# Patient Record
Sex: MALE | Race: WHITE | URBAN - NONMETROPOLITAN AREA
[De-identification: names, ages, dates, MRNs, and addresses within clinical notes are randomized per-mention and may not be internally consistent; named-entity substitution may affect disease eponyms.]

---

## 2020-10-22 ENCOUNTER — NEW PATIENT (OUTPATIENT)
Dept: URBAN - NONMETROPOLITAN AREA CLINIC 3 | Facility: CLINIC | Age: 75
End: 2020-10-22
Payer: MEDICARE

## 2020-10-22 DIAGNOSIS — Z79.84 LONG TERM (CURRENT) USE OF ORAL ANTIDIABETIC DRUGS: ICD-10-CM

## 2020-10-22 DIAGNOSIS — E11.9 TYPE 2 DIABETES MELLITUS WITHOUT COMPLICATIONS: ICD-10-CM

## 2020-10-22 DIAGNOSIS — E11.3212 DIABETES MELLITUS TYPE 2 WITH MILD NON-PROLIFERATI: ICD-10-CM

## 2020-10-22 DIAGNOSIS — H34.8322 BRANCH RETINAL VEIN OCCLUSION, STABLE, LEFT EYE: Primary | ICD-10-CM

## 2020-10-22 PROCEDURE — 92014 COMPRE OPH EXAM EST PT 1/>: CPT | Performed by: OPTOMETRIST

## 2020-10-22 PROCEDURE — 92134 CPTRZ OPH DX IMG PST SGM RTA: CPT | Performed by: OPTOMETRIST

## 2020-10-22 ASSESSMENT — VISUAL ACUITY
OD: 20/30
OS: 20/40

## 2020-10-22 ASSESSMENT — INTRAOCULAR PRESSURE
OD: 12
OS: 12

## 2020-10-23 ENCOUNTER — NEW PATIENT (OUTPATIENT)
Dept: URBAN - METROPOLITAN AREA CLINIC 24 | Facility: CLINIC | Age: 75
End: 2020-10-23
Payer: MEDICARE

## 2020-10-23 DIAGNOSIS — H25.813 COMBINED FORMS OF AGE-RELATED CATARACT, BILATERAL: ICD-10-CM

## 2020-10-23 PROCEDURE — 92004 COMPRE OPH EXAM NEW PT 1/>: CPT | Performed by: OPHTHALMOLOGY

## 2020-10-23 PROCEDURE — 67028 INJECTION EYE DRUG: CPT | Performed by: OPHTHALMOLOGY

## 2020-10-23 PROCEDURE — 92134 CPTRZ OPH DX IMG PST SGM RTA: CPT | Performed by: OPHTHALMOLOGY

## 2020-10-23 ASSESSMENT — INTRAOCULAR PRESSURE
OD: 13
OS: 10

## 2020-11-23 ENCOUNTER — FOLLOW UP ESTABLISHED (OUTPATIENT)
Dept: URBAN - METROPOLITAN AREA CLINIC 24 | Facility: CLINIC | Age: 75
End: 2020-11-23
Payer: MEDICARE

## 2020-11-23 DIAGNOSIS — H35.3131 NONEXUDATIVE AGE-RELATED MACULAR DEGENERATION, BILATERAL, EARLY DRY STAGE: ICD-10-CM

## 2020-11-23 PROCEDURE — 92250 FUNDUS PHOTOGRAPHY W/I&R: CPT | Performed by: OPHTHALMOLOGY

## 2020-11-23 PROCEDURE — 92235 FLUORESCEIN ANGRPH MLTIFRAME: CPT | Performed by: OPHTHALMOLOGY

## 2020-11-23 PROCEDURE — 67028 INJECTION EYE DRUG: CPT | Performed by: OPHTHALMOLOGY

## 2020-11-23 ASSESSMENT — INTRAOCULAR PRESSURE
OD: 16
OS: 12

## 2020-12-30 ENCOUNTER — FOLLOW UP ESTABLISHED (OUTPATIENT)
Dept: URBAN - METROPOLITAN AREA CLINIC 24 | Facility: CLINIC | Age: 75
End: 2020-12-30
Payer: MEDICARE

## 2020-12-30 PROCEDURE — 92134 CPTRZ OPH DX IMG PST SGM RTA: CPT | Performed by: OPHTHALMOLOGY

## 2020-12-30 PROCEDURE — 67028 INJECTION EYE DRUG: CPT | Performed by: OPHTHALMOLOGY

## 2020-12-30 ASSESSMENT — INTRAOCULAR PRESSURE
OD: 10
OS: 8

## 2021-02-12 ENCOUNTER — FOLLOW UP ESTABLISHED (OUTPATIENT)
Dept: URBAN - METROPOLITAN AREA CLINIC 24 | Facility: CLINIC | Age: 76
End: 2021-02-12
Payer: MEDICARE

## 2021-02-12 PROCEDURE — 92134 CPTRZ OPH DX IMG PST SGM RTA: CPT | Performed by: OPHTHALMOLOGY

## 2021-02-12 PROCEDURE — 67028 INJECTION EYE DRUG: CPT | Performed by: OPHTHALMOLOGY

## 2021-02-12 PROCEDURE — 99214 OFFICE O/P EST MOD 30 MIN: CPT | Performed by: OPHTHALMOLOGY

## 2021-02-12 ASSESSMENT — INTRAOCULAR PRESSURE
OD: 18
OS: 16

## 2021-03-18 ENCOUNTER — FOLLOW UP ESTABLISHED (OUTPATIENT)
Dept: URBAN - METROPOLITAN AREA CLINIC 24 | Facility: CLINIC | Age: 76
End: 2021-03-18
Payer: MEDICARE

## 2021-03-18 PROCEDURE — 67028 INJECTION EYE DRUG: CPT | Performed by: OPHTHALMOLOGY

## 2021-03-18 PROCEDURE — 92235 FLUORESCEIN ANGRPH MLTIFRAME: CPT | Performed by: OPHTHALMOLOGY

## 2021-03-18 PROCEDURE — 92250 FUNDUS PHOTOGRAPHY W/I&R: CPT | Performed by: OPHTHALMOLOGY

## 2021-03-18 ASSESSMENT — INTRAOCULAR PRESSURE
OS: 16
OD: 14

## 2021-05-13 ENCOUNTER — OFFICE VISIT (OUTPATIENT)
Dept: URBAN - METROPOLITAN AREA CLINIC 24 | Facility: CLINIC | Age: 76
End: 2021-05-13
Payer: MEDICARE

## 2021-05-13 DIAGNOSIS — H35.033 HYPERTENSIVE RETINOPATHY, BILATERAL: ICD-10-CM

## 2021-05-13 DIAGNOSIS — E11.3293 TYPE 2 DIABETES MELLITUS WITH MILD NONPROLIFERATIVE DIABETIC RETINOPATHY WITHOUT MACULAR EDEMA, BILATERAL: ICD-10-CM

## 2021-05-13 PROCEDURE — 67028 INJECTION EYE DRUG: CPT | Performed by: OPHTHALMOLOGY

## 2021-05-13 PROCEDURE — 92134 CPTRZ OPH DX IMG PST SGM RTA: CPT | Performed by: OPHTHALMOLOGY

## 2021-05-13 ASSESSMENT — INTRAOCULAR PRESSURE
OS: 18
OD: 18

## 2021-05-13 NOTE — IMPRESSION/PLAN
Impression: Hypertensive retinopathy, bilateral Plan: URGED BP control. RECHECK BP every time. no Occl.  OD.  AV nick

## 2021-05-13 NOTE — IMPRESSION/PLAN
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 Avastin trial x Oct '20 CHECK BP EVERY TIME --  TOO HIGH Plan: hx: [[Marie, OD.  BRVO OS -late awareness (dementia) HMG LRG supr. BRVO -Vein Occl. PCP control HTN is key - TREAT w Avastin OS x Oct '20 - missed Apr '21 w RECUR HMG and drop VA]] TODAY Avastin OS (proc note)   CHECK BP Always - TOO HIGH
     RTC 5-6w TIGHT still dil, OCT, eval  -h/o plan Avastin  Future FLTx ?   Future FA ?

## 2021-06-16 ENCOUNTER — OFFICE VISIT (OUTPATIENT)
Dept: URBAN - METROPOLITAN AREA CLINIC 24 | Facility: CLINIC | Age: 76
End: 2021-06-16
Payer: MEDICARE

## 2021-06-16 PROCEDURE — 92134 CPTRZ OPH DX IMG PST SGM RTA: CPT | Performed by: OPHTHALMOLOGY

## 2021-06-16 PROCEDURE — 67028 INJECTION EYE DRUG: CPT | Performed by: OPHTHALMOLOGY

## 2021-06-16 ASSESSMENT — INTRAOCULAR PRESSURE
OD: 17
OS: 17

## 2021-07-28 ENCOUNTER — OFFICE VISIT (OUTPATIENT)
Dept: URBAN - METROPOLITAN AREA CLINIC 24 | Facility: CLINIC | Age: 76
End: 2021-07-28
Payer: MEDICARE

## 2021-07-28 PROCEDURE — 92134 CPTRZ OPH DX IMG PST SGM RTA: CPT | Performed by: OPHTHALMOLOGY

## 2021-07-28 PROCEDURE — 67028 INJECTION EYE DRUG: CPT | Performed by: OPHTHALMOLOGY

## 2021-07-28 PROCEDURE — 92235 FLUORESCEIN ANGRPH MLTIFRAME: CPT | Performed by: OPHTHALMOLOGY

## 2021-07-28 NOTE — IMPRESSION/PLAN
Impression: Hypertensive retinopathy, bilateral Plan: URGED BP control. RECHECK BP every time. no Occl. OD  -- d/w'd pt and wife -- URGED BETTER CONTROL of SALT / BP / Wt/ BP meds / BG / Chol.

## 2021-07-28 NOTE — IMPRESSION/PLAN
Impression: BRVO CME LEFT - Awareness late? Avastin trial x Oct '20 - CHECK BP ALWAYS Plan: hx: [[Marie, OD.  BRVO OS -late awareness (dementia) HMG LRG supr. BRVO -Vein Occl. PCP control HTN is key - TREAT w Avastin OS x Oct '20 - missed Apr '21 w RECUR HMG and drop VA]] TODAY Avastin OS (proc note)   CHECK BP Always - TOO HIGH at times RTC 5-6w TIGHT ND/inj/OCT  plan Avastin    Future FLTx ?    Future exam?

## 2021-07-28 NOTE — IMPRESSION/PLAN
Impression: Age-related cataract Plan:  DEFER all non-retinal care to DR. Franco Sultana et al in [place]    Future MAY do Phaco/VIT / Laser OS for definitive tx of BRVO and cataract

## 2021-09-01 ENCOUNTER — OFFICE VISIT (OUTPATIENT)
Dept: URBAN - METROPOLITAN AREA CLINIC 24 | Facility: CLINIC | Age: 76
End: 2021-09-01
Payer: MEDICARE

## 2021-09-01 PROCEDURE — 92134 CPTRZ OPH DX IMG PST SGM RTA: CPT | Performed by: OPHTHALMOLOGY

## 2021-09-01 PROCEDURE — 92250 FUNDUS PHOTOGRAPHY W/I&R: CPT | Performed by: OPHTHALMOLOGY

## 2021-09-01 PROCEDURE — 67028 INJECTION EYE DRUG: CPT | Performed by: OPHTHALMOLOGY

## 2021-09-01 ASSESSMENT — INTRAOCULAR PRESSURE
OD: 15
OS: 15

## 2021-09-01 NOTE — IMPRESSION/PLAN
Impression: BRVO CME LEFT - Awareness late? Avastin x Oct '20 - CHECK BP Plan: hx: [[Woods, OD.  BRVO OS -late awareness (dementia) HMG LRG supr. BRVO -Vein Occl. PCP control HTN is key - Avstn OS xOct '20 - Apr '21 RECUR HMG w drop VA]] TODAY Avastin OS (proc note)   CHECK BP Always - TOO HIGH at times RTC 5-6w TIGHT dil, OCT, eval - plan Avastin w IVTA combo (1st time) Future FLTx ? Future FA ? May need FLTx superiorly OS ? May need Ozurdex ? CME RECUR -- NOT RESOLVED w Avastin anti-VEGF alone.   NEED STEROID

## 2021-09-30 ENCOUNTER — OFFICE VISIT (OUTPATIENT)
Dept: URBAN - METROPOLITAN AREA CLINIC 24 | Facility: CLINIC | Age: 76
End: 2021-09-30
Payer: MEDICARE

## 2021-09-30 PROCEDURE — 99214 OFFICE O/P EST MOD 30 MIN: CPT | Performed by: OPHTHALMOLOGY

## 2021-09-30 PROCEDURE — 92134 CPTRZ OPH DX IMG PST SGM RTA: CPT | Performed by: OPHTHALMOLOGY

## 2021-09-30 PROCEDURE — 67028 INJECTION EYE DRUG: CPT | Performed by: OPHTHALMOLOGY

## 2021-09-30 ASSESSMENT — INTRAOCULAR PRESSURE
OS: 14
OD: 12

## 2021-09-30 NOTE — IMPRESSION/PLAN
Impression: Open angle with borderline chng Plan: LOW risk GLAUC but not zero. TODAY TAP AC to 12. No compl.

## 2021-09-30 NOTE — IMPRESSION/PLAN
Impression: BRVO CME LEFT - Awareness late? Avastin x Oct '20 - CHECK BP Plan: hx: [[Woods, OD.  BRVO OS -late awareness (dementia) HMG LRG supr. BRVO -Vein Occl. PCP control HTN is key - Avstn OS xOct '20 - Apr '21 RECUR HMG w drop VA]] TODAY Avastin OS & IVTA (proc note w TAP AC)   CHECK BP Always - TOO HIGH oft
     RTC IOP 2w - RTC 6w TIGHT ND/inj pos OCT plan Avastin w IVTA (2nd time) Future FLTx ? Future FA ? May need FLTx superiorly OS ? May need Ozurdex ? CME RECUR -- NOT RESOLVED w Avastin anti-VEGF alone. NEED STEROID TRIAL  Future MAY do Phaco/VIT / Laser OS for definitive tx of BRVO and cataract

## 2021-09-30 NOTE — IMPRESSION/PLAN
Impression: Age-related cataract Plan: DEFER all non-retinal care to DR. Marie, et al in [place]  - will progress w IVTA    Future MAY do Phaco/VIT / Laser OS for definitive tx of BRVO and cataract

## 2021-10-19 ENCOUNTER — OFFICE VISIT (OUTPATIENT)
Dept: URBAN - NONMETROPOLITAN AREA CLINIC 3 | Facility: CLINIC | Age: 76
End: 2021-10-19
Payer: MEDICARE

## 2021-10-19 DIAGNOSIS — H40.012 OPEN ANGLE WITH BORDERLINE FINDINGS, LOW RISK, LEFT EYE: Primary | ICD-10-CM

## 2021-10-19 PROCEDURE — 99212 OFFICE O/P EST SF 10 MIN: CPT | Performed by: OPTOMETRIST

## 2021-10-19 ASSESSMENT — INTRAOCULAR PRESSURE
OD: 15
OS: 17

## 2021-10-19 NOTE — IMPRESSION/PLAN
Impression: Open angle with borderline chng Plan: IOP at target. Advised to keep appointment as scheduled with Dr. Mary Rodriguez.

## 2021-11-15 ENCOUNTER — OFFICE VISIT (OUTPATIENT)
Dept: URBAN - METROPOLITAN AREA CLINIC 30 | Facility: CLINIC | Age: 76
End: 2021-11-15
Payer: MEDICARE

## 2021-11-15 DIAGNOSIS — H34.8320 TRIBUTARY (BRANCH) RETINAL VEIN OCCLUSION, LEFT EYE, WITH MACULAR EDEMA: Primary | ICD-10-CM

## 2021-11-15 PROCEDURE — 65800 DRAINAGE OF EYE: CPT | Performed by: OPHTHALMOLOGY

## 2021-11-15 PROCEDURE — 92134 CPTRZ OPH DX IMG PST SGM RTA: CPT | Performed by: OPHTHALMOLOGY

## 2021-11-15 ASSESSMENT — INTRAOCULAR PRESSURE
OD: 16
OS: 18

## 2021-11-15 NOTE — IMPRESSION/PLAN
Impression: BRVO CME LEFT - Awareness late? Avastin x Oct '20 - CHECK BP Plan: hx: [[Woods, OD.  BRVO OS -late awareness (dementia) HMG LRG supr. BRVO -Vein Occl. PCP control HTN is key - Avstn OS xOct '20 - Apr '21 RECUR HMG w drop VA]] TODAY Avastin OS & IVTA (proc note w TAP AC)   CHECK BP Always - TOO HIGH oft
      RTC 6-8w TIGHT Pos FA - plan Avastin w IVTA (3rd time - NO IOP issues) Future FLTx ? Future ND?? May need FLTx superiorly OS ? May need Ozurdex ? CME RECUR -- NOT RESOLVED w Avastin anti-VEGF alone.   DOES RESOLVE w IVTA ADD  -- Future MAY do Phaco/VIT / Laser OS for definitive tx of BRVO and cataract

## 2021-11-15 NOTE — IMPRESSION/PLAN
Impression: Age-related cataract Plan: DEFER all non-retinal care to DR. Marie, et al in [place]  - will progress w IVTA    Future MAY do Phaco/VIT / Laser OS for definitive tx of BRVO and cataract

## 2021-11-15 NOTE — IMPRESSION/PLAN
Impression: Open angle with borderline chng Plan: LOW risk GLAUC but not zero. TODAY TAP AC to 10. No compl.

## 2022-01-14 ENCOUNTER — OFFICE VISIT (OUTPATIENT)
Dept: URBAN - METROPOLITAN AREA CLINIC 24 | Facility: CLINIC | Age: 77
End: 2022-01-14
Payer: MEDICARE

## 2022-01-14 PROCEDURE — 92250 FUNDUS PHOTOGRAPHY W/I&R: CPT | Performed by: OPHTHALMOLOGY

## 2022-01-14 PROCEDURE — 92235 FLUORESCEIN ANGRPH MLTIFRAME: CPT | Performed by: OPHTHALMOLOGY

## 2022-01-14 PROCEDURE — 92134 CPTRZ OPH DX IMG PST SGM RTA: CPT | Performed by: OPHTHALMOLOGY

## 2022-01-14 PROCEDURE — 65800 DRAINAGE OF EYE: CPT | Performed by: OPHTHALMOLOGY

## 2022-01-14 ASSESSMENT — INTRAOCULAR PRESSURE
OD: 13
OS: 18

## 2022-01-14 NOTE — IMPRESSION/PLAN
Impression: BRVO CME LEFT - Awareness late? Avastin x Oct '20 - CHECK BP Plan: hx: [[Woods, OD.  BRVO OS -late awareness (dementia) HMG LRG supr. BRVO -Vein Occl. PCP control HTN is key - Avstn OS xOct '20 - Apr '21 RECUR HMG w drop VA]] TODAY Avastin OS & IVTA (proc note w TAP AC)   CHECK BP Always - TOO HIGH oft
      RTC 8w TIGHT ND/inj/OCT  plan Avastin w OZURDEX (if apprv'd -NO IOP issues) Future FLTx ? Future exam??  May need FLTx superiorly OS ? Cont Ozurdex ? CME RECUR -- NOT RESOLVED w Avastin anti-VEGF alone.   DOES RESOLVE w IVTA ADD  -- Future MAY do Phaco/VIT / Laser OS for definitive tx of BRVO and cataract

## 2022-01-14 NOTE — IMPRESSION/PLAN
Impression: Age-related cataract Plan: DEFER all non-retinal care to DR. Marie, et al in [place]  - will progress w IVTA    Future MAY do Phaco/VIT / Laser OS for definitive tx of BRVO and cataract

## 2022-03-11 ENCOUNTER — OFFICE VISIT (OUTPATIENT)
Dept: URBAN - METROPOLITAN AREA CLINIC 24 | Facility: CLINIC | Age: 77
End: 2022-03-11
Payer: MEDICARE

## 2022-03-11 PROCEDURE — 92134 CPTRZ OPH DX IMG PST SGM RTA: CPT | Performed by: OPHTHALMOLOGY

## 2022-03-11 PROCEDURE — 99214 OFFICE O/P EST MOD 30 MIN: CPT | Performed by: OPHTHALMOLOGY

## 2022-03-11 PROCEDURE — 65800 DRAINAGE OF EYE: CPT | Performed by: OPHTHALMOLOGY

## 2022-03-11 ASSESSMENT — INTRAOCULAR PRESSURE
OD: 13
OS: 21

## 2022-03-11 NOTE — IMPRESSION/PLAN
Impression: BRVO CME LEFT - Awareness late? Avastin x Oct '20 - CHECK BP Plan: hx: [[Woods, OD.  BRVO OS -late awareness (dementia) HMG LRG supr. BRVO -Vein Occl. PCP control HTN is key - Avstn OS xOct '20 - Apr '21 RECUR HMG w drop VA]] TODAY Avastin & Ozurdex OS (proc note w TAP AC today) CHECK BP Always - TOO HIGH oft RTC for  Phaco/IOL w VIT / Laser OS for definitive tx of BRVO and cataract In surgery will add FLTx superiorly OS (or add at time of VIT Laser) ? POST op may need cont Ozurdex Future PRN ? CME RECUR -- NOT RESOLVED w Avastin anti-VEGF alone.   DOES RESOLVE w steroid --surg / steroid

## 2022-03-11 NOTE — IMPRESSION/PLAN
Impression: Age-related cataract [facility] Client [employer] / halos OS  Plan: GENERAL non-retinal care to DR. Marie, et al in [place]  - DID carolin HOWARD address cataract too --  Phaco/VIT / Laser OS for definitive tx

## 2022-03-31 ENCOUNTER — TESTING ONLY (OUTPATIENT)
Dept: URBAN - METROPOLITAN AREA CLINIC 24 | Facility: CLINIC | Age: 77
End: 2022-03-31
Payer: MEDICARE

## 2022-03-31 DIAGNOSIS — Z01.818 ENCOUNTER FOR OTHER PREPROCEDURAL EXAMINATION: Primary | ICD-10-CM

## 2022-03-31 PROCEDURE — 99203 OFFICE O/P NEW LOW 30 MIN: CPT | Performed by: PHYSICIAN ASSISTANT

## 2022-03-31 RX ORDER — OFLOXACIN 3 MG/ML
0.3 % SOLUTION/ DROPS OPHTHALMIC
Qty: 5 | Refills: 1 | Status: ACTIVE
Start: 2022-03-31

## 2022-03-31 RX ORDER — PREDNISOLONE ACETATE 10 MG/ML
1 % SUSPENSION/ DROPS OPHTHALMIC
Qty: 5 | Refills: 1 | Status: ACTIVE
Start: 2022-03-31

## 2022-03-31 ASSESSMENT — PACHYMETRY
OS: 24.27
OD: 24.03
OS: 3.64
OD: 3.39

## 2022-04-14 ENCOUNTER — POST-OPERATIVE VISIT (OUTPATIENT)
Dept: URBAN - METROPOLITAN AREA CLINIC 24 | Facility: CLINIC | Age: 77
End: 2022-04-14
Payer: MEDICARE

## 2022-04-14 DIAGNOSIS — Z48.810 ENCOUNTER FOR SURGICAL AFTERCARE FOLLOWING SURGERY ON A SENSE ORGAN: Primary | ICD-10-CM

## 2022-04-14 PROCEDURE — 99024 POSTOP FOLLOW-UP VISIT: CPT | Performed by: STUDENT IN AN ORGANIZED HEALTH CARE EDUCATION/TRAINING PROGRAM

## 2022-04-14 NOTE — IMPRESSION/PLAN
Impression: S/P Posterior Vitrectomy (PPVIT)/Phaco/IOL/Laser/STTA OS - 1 Day. Encounter for surgical aftercare following surgery on a sense organ  Z48.810. Post operative instructions reviewed - Plan: Begin Ofloxacin QID OD and Prednisolone QID OS with weekly taper

## 2022-04-28 ENCOUNTER — OFFICE VISIT (OUTPATIENT)
Dept: URBAN - METROPOLITAN AREA CLINIC 24 | Facility: CLINIC | Age: 77
End: 2022-04-28
Payer: MEDICARE

## 2022-04-28 DIAGNOSIS — H40.012 OPEN ANGLE WITH BORDERLINE FINDINGS, LOW RISK, LEFT EYE: ICD-10-CM

## 2022-04-28 DIAGNOSIS — H34.8320 TRIBUTARY (BRANCH) RETINAL VEIN OCCLUSION, LEFT EYE, WITH MACULAR EDEMA: Primary | ICD-10-CM

## 2022-04-28 PROCEDURE — 99024 POSTOP FOLLOW-UP VISIT: CPT | Performed by: OPHTHALMOLOGY

## 2022-04-28 PROCEDURE — 92134 CPTRZ OPH DX IMG PST SGM RTA: CPT | Performed by: OPHTHALMOLOGY

## 2022-04-28 ASSESSMENT — INTRAOCULAR PRESSURE
OS: 16
OD: 12

## 2022-04-28 NOTE — IMPRESSION/PLAN
Impression: BRVO CME LEFT - Awareness late? Avastin x Oct '20 - CHECK BP
  VIT/Laser/PCIOL OS Apr '22 Ozurdex also - REDUCE injx Plan: hx: [[Marie, OD.  BRVO OS -late awareness (dementia) HMG LRG supr. BRVO -Vein Occl. PCP control HTN is key - Avstn OS xOct '20 - Apr '21 RECUR HMG w drop VA]] TODAY Avastin & Ozurdex OS (proc note w TAP AC today) CHECK BP Always - TOO HIGH oft DONE w Phaco/IOL w VIT Sup Laser OS for definitive tx of BRVO Apr '22 TODAY postop IMPROVED. New IOL clear / centered. Good laser. No CME
      RTC Gen eye / MRx 1mo. RETINA 3mo w [employer] [place] / [employer] FLTx superiorly OS (or add at time of VIT Laser) ? POST op may need periodic PRN Ozurdex OS (prior CME RECUR -- NOT RESOLVED w Avastin anti-VEGF alone. DOES RESOLVE w steroid --surg / steroid to REDUCE injx).

## 2022-04-28 NOTE — IMPRESSION/PLAN
Impression: Open angle with borderline chng PCIOL done w ROBERTO Rios Apr '22 Plan: LOW risk GLAUC but not zero. TODAY IOP Fair postop PCIOL done w ROBERTO Rios Apr '22 -- OK Mrx and Gen eye care now.

## 2022-05-23 ENCOUNTER — OFFICE VISIT (OUTPATIENT)
Dept: URBAN - NONMETROPOLITAN AREA CLINIC 3 | Facility: CLINIC | Age: 77
End: 2022-05-23
Payer: MEDICARE

## 2022-05-23 DIAGNOSIS — E11.3212 DIABETES MELLITUS TYPE 2 WITH MILD NON-PROLIFERATI: ICD-10-CM

## 2022-05-23 DIAGNOSIS — H25.811 COMBINED FORMS OF AGE-RELATED CATARACT, RIGHT EYE: ICD-10-CM

## 2022-05-23 DIAGNOSIS — H34.8322 BRANCH RETINAL VEIN OCCLUSION, STABLE, LEFT EYE: Primary | ICD-10-CM

## 2022-05-23 DIAGNOSIS — H52.223 REGULAR ASTIGMATISM, BILATERAL: ICD-10-CM

## 2022-05-23 PROCEDURE — 99213 OFFICE O/P EST LOW 20 MIN: CPT | Performed by: OPTOMETRIST

## 2022-05-23 PROCEDURE — 92134 CPTRZ OPH DX IMG PST SGM RTA: CPT | Performed by: OPTOMETRIST

## 2022-05-23 ASSESSMENT — INTRAOCULAR PRESSURE
OD: 14
OS: 14

## 2022-05-23 ASSESSMENT — VISUAL ACUITY
OS: 20/70
OD: 20/70

## 2022-05-23 NOTE — IMPRESSION/PLAN
Impression: Branch retinal vein occlusion, stable, left eye Plan: OS: Discussed diagnosis in detail with patient. Discussed treatment options with patient. Discussed risks and benefits and patient understands. Consult Advised to continue  with  Dr [place] Samson as  scheduled.

## 2022-05-23 NOTE — IMPRESSION/PLAN
Impression: Combined forms of age-related cataract, right eye: H25.811. Plan: Discussed cataract diagnosis with the patient. Discussed and reviewed treatment options for cataracts. Surgical intervention not necessary at this time. Advised re-evaluation for progressive vision loss.

## 2022-05-23 NOTE — IMPRESSION/PLAN
Impression: Diabetes mellitus Type 2 with mild non-proliferative retinopathy with macular edema, left eye Plan: Discussed ophthalmic risks. Patient agrees and understands. Educational material was provided in office today. Discussed need for re-evaluation with  patient. Patient understands and accepts. OS: Heme superior

## 2022-11-02 ENCOUNTER — OFFICE VISIT (OUTPATIENT)
Dept: URBAN - METROPOLITAN AREA CLINIC 24 | Facility: CLINIC | Age: 77
End: 2022-11-02
Payer: MEDICARE

## 2022-11-02 DIAGNOSIS — H34.8320 TRIBUTARY (BRANCH) RETINAL VEIN OCCLUSION, LEFT EYE, WITH MACULAR EDEMA: Primary | ICD-10-CM

## 2022-11-02 DIAGNOSIS — H40.012 OPEN ANGLE WITH BORDERLINE FINDINGS, LOW RISK, LEFT EYE: ICD-10-CM

## 2022-11-02 PROCEDURE — 92134 CPTRZ OPH DX IMG PST SGM RTA: CPT | Performed by: OPHTHALMOLOGY

## 2022-11-02 PROCEDURE — 99214 OFFICE O/P EST MOD 30 MIN: CPT | Performed by: OPHTHALMOLOGY

## 2022-11-02 ASSESSMENT — INTRAOCULAR PRESSURE
OD: 17
OS: 17

## 2022-11-02 NOTE — IMPRESSION/PLAN
Impression: Open angle with borderline chng PCIOL done w ROBERTO Rios Apr '22 Plan:   TODAY IOP Fair postop   LOW risk GLAUC but not zero. PCIOL done w ROBERTO Rios Apr '22 -- OK Mrx and Gen eye care now.